# Patient Record
Sex: FEMALE | Race: WHITE
[De-identification: names, ages, dates, MRNs, and addresses within clinical notes are randomized per-mention and may not be internally consistent; named-entity substitution may affect disease eponyms.]

---

## 2019-03-12 ENCOUNTER — HOSPITAL ENCOUNTER (OUTPATIENT)
Dept: HOSPITAL 47 - RADXRYALE | Age: 69
Discharge: HOME | End: 2019-03-12
Attending: PHYSICIAN ASSISTANT
Payer: COMMERCIAL

## 2019-03-12 DIAGNOSIS — M85.88: ICD-10-CM

## 2019-03-12 DIAGNOSIS — M46.96: ICD-10-CM

## 2019-03-12 DIAGNOSIS — M41.86: ICD-10-CM

## 2019-03-12 DIAGNOSIS — M51.36: Primary | ICD-10-CM

## 2019-03-12 PROCEDURE — 72110 X-RAY EXAM L-2 SPINE 4/>VWS: CPT

## 2019-10-29 ENCOUNTER — HOSPITAL ENCOUNTER (OUTPATIENT)
Dept: HOSPITAL 47 - RADXRYALE | Age: 69
Discharge: HOME | End: 2019-10-29
Attending: PHYSICIAN ASSISTANT
Payer: COMMERCIAL

## 2019-10-29 DIAGNOSIS — R07.82: Primary | ICD-10-CM

## 2019-10-29 NOTE — XR
EXAMINATION TYPE: XR ribs RT w pa chest xray

 

DATE OF EXAM: 10/29/2019

 

COMPARISON: NONE

 

TECHNIQUE: PA view of the chest and 4 views of the right ribs are submitted.

 

HISTORY: Pain

 

FINDINGS:

The lungs are clear and  there is no pneumothorax, pleural effusion, or focal pneumonia.  Scoliotic c
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 Arthropathy of the shoulders. No overt failure. There
 is slight deformity involving the anterior lateral right seventh, eighth and ninth ribs.

 

IMPRESSION: 

1. Correlate for hairline minimally displaced fractures anterolateral right fourth, seventh, eighth a
nd ninth ribs..

## 2021-04-15 ENCOUNTER — HOSPITAL ENCOUNTER (OUTPATIENT)
Dept: HOSPITAL 47 - RADXRYALE | Age: 71
Discharge: HOME | End: 2021-04-15
Attending: PHYSICIAN ASSISTANT
Payer: MEDICARE

## 2021-04-15 DIAGNOSIS — M51.16: Primary | ICD-10-CM

## 2021-04-15 DIAGNOSIS — M85.80: ICD-10-CM

## 2021-04-15 PROCEDURE — 72110 X-RAY EXAM L-2 SPINE 4/>VWS: CPT

## 2021-04-15 NOTE — XR
Lumbosacral spine

 

HISTORY: Radiculopathy, low back pain

 

5 views of lumbosacral spine correlated prior exam 3/12/2019

 

Bone mineralization is reduced. Sclerosis is present in the posterior elements of the lower lumbar sp
ine. There is multilevel spondylosis. Loss of disc height is present at intervertebral levels, vacuum
 phenomenon present at L2-3, L5-S1. Lumbar vertebral bodies show preserved height. There is a spinal 
curvature. No evident spondylolysis.

 

IMPRESSION: Findings are similar to prior exam. Degenerative disc disease, facet arthropathy, osteope
kathie, scoliosis.

## 2021-12-20 ENCOUNTER — HOSPITAL ENCOUNTER (OUTPATIENT)
Dept: HOSPITAL 47 - RADXRYALE | Age: 71
Discharge: HOME | End: 2021-12-20
Attending: FAMILY MEDICINE
Payer: MEDICARE

## 2021-12-20 DIAGNOSIS — R07.82: Primary | ICD-10-CM
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
## 2021-12-20 PROCEDURE — 71111 X-RAY EXAM RIBS/CHEST4/> VWS: CPT

## 2021-12-20 NOTE — XR
EXAMINATION TYPE: XR ribs bilat w pa chest xray

 

DATE OF EXAM: 12/20/2021

 

COMPARISON: 10/29/2019

 

HISTORY: Chronic pain

 

TECHNIQUE: 9 views submitted

 

FINDINGS: Diffuse osteopenia. Curvature of the spine. Heart size normal. No consolidation or pneumoth
orax. Visualized rib cage is grossly intact.

 

IMPRESSION: No acute displaced rib fracture

## 2022-06-01 ENCOUNTER — HOSPITAL ENCOUNTER (OUTPATIENT)
Dept: HOSPITAL 47 - RADXRYALE | Age: 72
Discharge: HOME | End: 2022-06-01
Attending: PHYSICIAN ASSISTANT
Payer: MEDICARE

## 2022-06-01 DIAGNOSIS — M54.50: Primary | ICD-10-CM

## 2022-06-01 PROCEDURE — 72100 X-RAY EXAM L-S SPINE 2/3 VWS: CPT

## 2022-06-01 NOTE — XR
Lumbar spine

 

HISTORY: Low back pain

 

3 views the lumbar spine submitted and correlated prior exam 4/15/2021

 

Mineralization is reduced. There is multilevel spondylosis. There is a levoscoliosis centered at L2 a
s on prior. Loss of disc height is present at intervertebral levels, vacuum phenomenon present L5-S1,
 L2-3. Anterolisthesis grade 1 L3-4, L4-5 again seen and likely degenerative. Sclerosis is present in
 the posterior elements.

 

IMPRESSION: Degenerative disc disease, facet arthropathy, osteopenia and spinal curvature

## 2024-11-25 ENCOUNTER — HOSPITAL ENCOUNTER (OUTPATIENT)
Dept: HOSPITAL 47 - RADXRYALE | Age: 74
Discharge: HOME | End: 2024-11-25
Attending: PHYSICIAN ASSISTANT
Payer: MEDICARE

## 2024-11-25 DIAGNOSIS — M95.4: ICD-10-CM

## 2024-11-25 DIAGNOSIS — R07.9: Primary | ICD-10-CM
